# Patient Record
Sex: FEMALE | Race: BLACK OR AFRICAN AMERICAN | NOT HISPANIC OR LATINO | Employment: OTHER | URBAN - METROPOLITAN AREA
[De-identification: names, ages, dates, MRNs, and addresses within clinical notes are randomized per-mention and may not be internally consistent; named-entity substitution may affect disease eponyms.]

---

## 2024-06-10 ENCOUNTER — TELEPHONE (OUTPATIENT)
Dept: HEMATOLOGY/ONCOLOGY | Facility: HOSPITAL | Age: 79
End: 2024-06-10
Payer: MEDICARE

## 2024-06-10 NOTE — TELEPHONE ENCOUNTER
Spoke with patient, she understood that she should follow up with her PCP, PCP will refer her to medical oncology if any new symptoms come. Ms. Gutierrez verbalized understanding of the information and will see her PCP on July 11th.     ----- Message from Chely Bazan RN sent at 6/7/2024  5:24 PM EDT -----  Regarding: RE: BREAST SURVIVOR  Called patient and left the information on her voicemail. At this point unless she has new breast concerns she should just follow up with her PCP. If she has new breast concerns she can call us at [phone].  ----- Message -----  From: Kesha Chavez RN  Sent: 6/6/2024   4:42 PM EDT  To: MARCUS Ochoa; Avery Guerrero; #  Subject: RE: BREAST SURVIVOR                              Thank you Tungkashif  Breast team can someone call her tomorrow to discuss. Patients follow up with PCP at this point unless she has new concerns.  ----- Message -----  From: Avery Guerrero  Sent: 6/6/2024   1:04 PM EDT  To: Kesha Chavez RN; MARCUS Ochoa; #  Subject: RE: BREAST SURVIVOR                              Sorry I sent this to kesha earlier today I'm not sure if she saw it yet but I said     Hi, I finally got an answer from her. Her best phone number is [phone] the phone will not ring but she will answer. She stated she had a double mastectomy back in '88 and '95 at the [facility] clinic she believe it was in [place]. The surgeon name was Iva? She's not exactly sure the first name of the Dr. She states it was a male surgeon. She has been seeing a family medicine for a check up    ----- Message -----  From: MARCUS Ochoa  Sent: 6/6/2024  12:55 PM EDT  To: Kesha Chavez RN; Avery Guerrero; #  Subject: RE: BREAST SURVIVOR                              I thought I responded to this but I can't find it! She has an appointment with primary care in July. You could wait till then and see what they say. You could call her emergency  as patient maybe confused. If you  can't reach her on the phone you can send her a letter.  ----- Message -----  From: Leanna Chavez RN  Sent: 6/6/2024  11:13 AM EDT  To: Rose DE LEON MARCUS Polanco; Avery Guerrero; #  Subject: RE: BREAST SURVIVOR                              Rose,  This patient called our team for an appointment I don't see a history of breast cancer and she wanted an apt. We have not been able to get a hold of her. Should we pursue?  Thank you     ----- Message -----  From: Avery Guerrero  Sent: 6/6/2024  10:22 AM EDT  To: Leanna Chavez RN; [facility] Breast Disease Team  Subject: RE: BREAST SURVIVOR                              I tried calling pt again on the numbers listed in chart. Phone is not in service. Thank you   ----- Message -----  From: Leanna Chavez RN  Sent: 6/6/2024  10:10 AM EDT  To: Avery Guerrero; [facility] Breast Disease Team  Subject: RE: BREAST SURVIVOR                              Adding my team as I am away tomorrow.  Thank you    ----- Message -----  From: Avery Guerrero  Sent: 6/5/2024  10:34 AM EDT  To: Leanna Chavez RN  Subject: RE: BREAST SURVIVOR                              I called back no answer either and I looked on EPIC I didn't see any history as well that's why I had to message you guys. And no she did not state I will try again later to reach out to find more info. Thank you   ----- Message -----  From: Leanna Chavez RN  Sent: 6/5/2024  10:20 AM EDT  To: Avery Guerrero  Subject: RE: BREAST SURVIVOR                              Chavez Varela,  I don't see any history in Epic, do you know who she is seeing? When was her breast cancer. I tried to call her to inquire and no answer. Are you able to call her back and ask when and where she was seen?  Thank you    ----- Message -----  From: Avery Guerrero  Sent: 6/5/2024   9:41 AM EDT  To: Leanna Chavez RN  Subject: FW: BREAST SURVIVOR                              Please see message below. Thank you   ----- Message -----  From: Gela Cruz RN  Sent: 6/5/2024   9:33 AM  EDT  To: Avery Guerrero; [facility]igators Pool  Subject: RE: BREAST SURVIVOR                              Good morning,    Please reach out to Leanna Chavez as I am not sure about their survivorship process in breast.    Thanks,    Julito  ----- Message -----  From: Avery Guerrero  Sent: 6/5/2024   9:07 AM EDT  To: [facility]ators Pool  Subject: BREAST SURVIVOR                                  Good morning, pt has been seeing a family med. For a FUV for breast. Can she see one of our providers? I have looked in [place] I have not seen her scheduled with us. Please advise thank you.

## 2024-07-11 ENCOUNTER — LAB (OUTPATIENT)
Dept: LAB | Facility: LAB | Age: 79
End: 2024-07-11
Payer: MEDICARE

## 2024-07-11 ENCOUNTER — APPOINTMENT (OUTPATIENT)
Dept: PRIMARY CARE | Facility: CLINIC | Age: 79
End: 2024-07-11
Payer: MEDICARE

## 2024-07-11 VITALS
BODY MASS INDEX: 19.98 KG/M2 | HEIGHT: 65 IN | OXYGEN SATURATION: 100 % | TEMPERATURE: 97 F | HEART RATE: 69 BPM | SYSTOLIC BLOOD PRESSURE: 152 MMHG | DIASTOLIC BLOOD PRESSURE: 67 MMHG | WEIGHT: 119.9 LBS

## 2024-07-11 DIAGNOSIS — C50.912 MALIGNANT NEOPLASM OF LEFT FEMALE BREAST, UNSPECIFIED ESTROGEN RECEPTOR STATUS, UNSPECIFIED SITE OF BREAST (MULTI): ICD-10-CM

## 2024-07-11 DIAGNOSIS — Z90.13 H/O BILATERAL MASTECTOMY: Primary | ICD-10-CM

## 2024-07-11 DIAGNOSIS — I10 HYPERTENSION, ESSENTIAL, BENIGN: ICD-10-CM

## 2024-07-11 DIAGNOSIS — E11.21 CONTROLLED TYPE 2 DIABETES MELLITUS WITH DIABETIC NEPHROPATHY, UNSPECIFIED WHETHER LONG TERM INSULIN USE (MULTI): ICD-10-CM

## 2024-07-11 DIAGNOSIS — D50.8 OTHER IRON DEFICIENCY ANEMIA: ICD-10-CM

## 2024-07-11 PROBLEM — D50.9 IRON DEFICIENCY ANEMIA: Status: ACTIVE | Noted: 2024-07-11

## 2024-07-11 PROBLEM — Z90.710 H/O: HYSTERECTOMY: Status: ACTIVE | Noted: 2024-07-11

## 2024-07-11 PROBLEM — E11.9 DIABETES MELLITUS TYPE 2, CONTROLLED (MULTI): Status: ACTIVE | Noted: 2024-07-11

## 2024-07-11 LAB
ALBUMIN SERPL BCP-MCNC: 4.2 G/DL (ref 3.4–5)
ALP SERPL-CCNC: 56 U/L (ref 33–136)
ALT SERPL W P-5'-P-CCNC: 4 U/L (ref 7–45)
ANION GAP SERPL CALC-SCNC: 11 MMOL/L (ref 10–20)
AST SERPL W P-5'-P-CCNC: 16 U/L (ref 9–39)
BASOPHILS # BLD AUTO: 0.08 X10*3/UL (ref 0–0.1)
BASOPHILS NFR BLD AUTO: 1.9 %
BILIRUB SERPL-MCNC: 0.6 MG/DL (ref 0–1.2)
BUN SERPL-MCNC: 10 MG/DL (ref 6–23)
CALCIUM SERPL-MCNC: 9.1 MG/DL (ref 8.6–10.6)
CHLORIDE SERPL-SCNC: 110 MMOL/L (ref 98–107)
CHOLEST SERPL-MCNC: 69 MG/DL (ref 0–199)
CHOLESTEROL/HDL RATIO: 1.7
CO2 SERPL-SCNC: 25 MMOL/L (ref 21–32)
CREAT SERPL-MCNC: 0.62 MG/DL (ref 0.5–1.05)
DACRYOCYTES BLD QL SMEAR: NORMAL
EGFRCR SERPLBLD CKD-EPI 2021: >90 ML/MIN/1.73M*2
EOSINOPHIL # BLD AUTO: 0.15 X10*3/UL (ref 0–0.4)
EOSINOPHIL NFR BLD AUTO: 3.6 %
ERYTHROCYTE [DISTWIDTH] IN BLOOD BY AUTOMATED COUNT: 22.3 % (ref 11.5–14.5)
EST. AVERAGE GLUCOSE BLD GHB EST-MCNC: 94 MG/DL
GLUCOSE SERPL-MCNC: 103 MG/DL (ref 74–99)
HBA1C MFR BLD: 4.9 %
HCT VFR BLD AUTO: 22.2 % (ref 36–46)
HDLC SERPL-MCNC: 41.5 MG/DL
HGB BLD-MCNC: 5.1 G/DL (ref 12–16)
HYPOCHROMIA BLD QL SMEAR: NORMAL
IMM GRANULOCYTES # BLD AUTO: 0.01 X10*3/UL (ref 0–0.5)
IMM GRANULOCYTES NFR BLD AUTO: 0.2 % (ref 0–0.9)
IRON SATN MFR SERPL: ABNORMAL %
IRON SERPL-MCNC: 11 UG/DL (ref 35–150)
LDLC SERPL CALC-MCNC: 22 MG/DL
LYMPHOCYTES # BLD AUTO: 1.12 X10*3/UL (ref 0.8–3)
LYMPHOCYTES NFR BLD AUTO: 26.9 %
MCH RBC QN AUTO: 13 PG (ref 26–34)
MCHC RBC AUTO-ENTMCNC: 23 G/DL (ref 32–36)
MCV RBC AUTO: 57 FL (ref 80–100)
MONOCYTES # BLD AUTO: 0.61 X10*3/UL (ref 0.05–0.8)
MONOCYTES NFR BLD AUTO: 14.6 %
NEUTROPHILS # BLD AUTO: 2.2 X10*3/UL (ref 1.6–5.5)
NEUTROPHILS NFR BLD AUTO: 52.8 %
NON HDL CHOLESTEROL: 28 MG/DL (ref 0–149)
NRBC BLD-RTO: 0 /100 WBCS (ref 0–0)
OVALOCYTES BLD QL SMEAR: NORMAL
PLATELET # BLD AUTO: 341 X10*3/UL (ref 150–450)
POTASSIUM SERPL-SCNC: 3.8 MMOL/L (ref 3.5–5.3)
PROT SERPL-MCNC: 7.6 G/DL (ref 6.4–8.2)
RBC # BLD AUTO: 3.91 X10*6/UL (ref 4–5.2)
RBC MORPH BLD: NORMAL
SCHISTOCYTES BLD QL SMEAR: NORMAL
SODIUM SERPL-SCNC: 142 MMOL/L (ref 136–145)
TIBC SERPL-MCNC: ABNORMAL UG/DL
TRIGL SERPL-MCNC: 27 MG/DL (ref 0–149)
UIBC SERPL-MCNC: >450 UG/DL (ref 110–370)
VLDL: 5 MG/DL (ref 0–40)
WBC # BLD AUTO: 4.2 X10*3/UL (ref 4.4–11.3)

## 2024-07-11 PROCEDURE — 80053 COMPREHEN METABOLIC PANEL: CPT

## 2024-07-11 PROCEDURE — 99204 OFFICE O/P NEW MOD 45 MIN: CPT

## 2024-07-11 PROCEDURE — 3077F SYST BP >= 140 MM HG: CPT

## 2024-07-11 PROCEDURE — 3078F DIAST BP <80 MM HG: CPT

## 2024-07-11 PROCEDURE — 80061 LIPID PANEL: CPT

## 2024-07-11 PROCEDURE — 83036 HEMOGLOBIN GLYCOSYLATED A1C: CPT

## 2024-07-11 PROCEDURE — 83540 ASSAY OF IRON: CPT

## 2024-07-11 PROCEDURE — 36415 COLL VENOUS BLD VENIPUNCTURE: CPT

## 2024-07-11 PROCEDURE — 85025 COMPLETE CBC W/AUTO DIFF WBC: CPT

## 2024-07-11 PROCEDURE — 1126F AMNT PAIN NOTED NONE PRSNT: CPT

## 2024-07-11 PROCEDURE — 1159F MED LIST DOCD IN RCRD: CPT

## 2024-07-11 PROCEDURE — 83550 IRON BINDING TEST: CPT

## 2024-07-11 PROCEDURE — 1036F TOBACCO NON-USER: CPT

## 2024-07-11 ASSESSMENT — PATIENT HEALTH QUESTIONNAIRE - PHQ9
SUM OF ALL RESPONSES TO PHQ9 QUESTIONS 1 AND 2: 0
2. FEELING DOWN, DEPRESSED OR HOPELESS: NOT AT ALL
1. LITTLE INTEREST OR PLEASURE IN DOING THINGS: NOT AT ALL

## 2024-07-11 ASSESSMENT — PAIN SCALES - GENERAL: PAINLEVEL: 0-NO PAIN

## 2024-07-11 ASSESSMENT — COLUMBIA-SUICIDE SEVERITY RATING SCALE - C-SSRS
6. HAVE YOU EVER DONE ANYTHING, STARTED TO DO ANYTHING, OR PREPARED TO DO ANYTHING TO END YOUR LIFE?: NO
1. IN THE PAST MONTH, HAVE YOU WISHED YOU WERE DEAD OR WISHED YOU COULD GO TO SLEEP AND NOT WAKE UP?: NO
2. HAVE YOU ACTUALLY HAD ANY THOUGHTS OF KILLING YOURSELF?: NO

## 2024-07-11 ASSESSMENT — ENCOUNTER SYMPTOMS
LOSS OF SENSATION IN FEET: 0
OCCASIONAL FEELINGS OF UNSTEADINESS: 0
DEPRESSION: 0

## 2024-07-11 NOTE — PROGRESS NOTES
Subjective   Patient ID: Soto Gutierrez is a 78 y.o. female with PMH of Breast cancer and bilateral mastectomy, Hx Hysterectomy due to fibroid uterus, previous hx HTN, T2DM, HFpEF who presents to establish care with new PCP. Additional concern(s): Establish Care (Check up).    HPI:  - Pt is here to get some routine blood work and establish care, has no acute complaints or concerns.     - Pt has previous documented history of HTN and was on amlodipine, however, does not take any medication besides OTC ASA.  - Previously documented T2DM however, A1C 5.5 in 2018  - Pt previously was on iron supplements, had low TIBC and iron saturation  - Echo from 2019 showed  left ventricular systolic function is normal. Spectral Doppler shows an impaired relaxation pattern of left ventricular diastolic filling. The left atrium is mild to moderately dilated. Mild mitral regurgitation.    Former smoker, 0.5ppd for 20 years,has stopped for over 20 years now  Never alcohol  No other illicit drugs  Allergies - KNDA  Surgical Hx - Bilaterla mastectomy and Hysterectomy  Medications - Asa 81 mg    12 system ROS completed, negative except as noted above.    Patient care team:  No care team member to display     Patient Active Problem List   Diagnosis    H/O: hysterectomy    Malignant neoplasm of left female breast (Multi)    H/O bilateral mastectomy    Diabetes mellitus type 2, controlled (Multi)    Hypertension, essential, benign    Iron deficiency anemia     Past Medical History:   Diagnosis Date    Age-related nuclear cataract, right eye 10/02/2015    Age-related nuclear cataract of right eye    Cortical age-related cataract, right eye 10/02/2015    Cortical age-related cataract of right eye    Personal history of colonic polyps 02/07/2017    History of colon polyps    Presence of intraocular lens 10/02/2015    Pseudophakia of right eye     No past surgical history on file.  No current outpatient medications on file.     No current  "facility-administered medications for this visit.      No Known Allergies  Social History     Socioeconomic History    Marital status: Single     Spouse name: Not on file    Number of children: Not on file    Years of education: Not on file    Highest education level: Not on file   Occupational History    Not on file   Tobacco Use    Smoking status: Never    Smokeless tobacco: Never   Substance and Sexual Activity    Alcohol use: Never    Drug use: Never    Sexual activity: Not on file   Other Topics Concern    Not on file   Social History Narrative    Not on file     Social Determinants of Health     Financial Resource Strain: Not on file   Food Insecurity: Not on file   Transportation Needs: Not on file   Physical Activity: Not on file   Stress: Not on file   Social Connections: Not on file   Intimate Partner Violence: Not on file   Housing Stability: Not on file     No family history on file.     Social Determinants of Health     Tobacco Use: Low Risk  (7/11/2024)    Patient History     Smoking Tobacco Use: Never     Smokeless Tobacco Use: Never     Passive Exposure: Not on file   Alcohol Use: Not on file   Financial Resource Strain: Not on file   Food Insecurity: Not on file   Transportation Needs: Not on file   Physical Activity: Not on file   Stress: Not on file   Social Connections: Not on file   Intimate Partner Violence: Not on file   Depression: Not at risk (7/11/2024)    PHQ-2     PHQ-2 Score: 0   Housing Stability: Not on file   Utilities: Not on file   Digital Equity: Not on file   Health Literacy: Not on file        Objective   Vitals: /67 (BP Location: Right arm, Patient Position: Sitting, BP Cuff Size: Small adult)   Pulse 69   Temp 36.1 °C (97 °F) (Temporal)   Ht 1.651 m (5' 5\")   Wt 54.4 kg (119 lb 14.4 oz)   SpO2 100%   BMI 19.95 kg/m²      Physical Exam  Constitutional:       General: She is not in acute distress.     Appearance: Normal appearance. She is not ill-appearing. "   Cardiovascular:      Rate and Rhythm: Normal rate and regular rhythm.      Pulses: Normal pulses.      Heart sounds: Normal heart sounds. No murmur heard.     No gallop.   Pulmonary:      Effort: Pulmonary effort is normal.      Breath sounds: Normal breath sounds. No stridor. No wheezing.   Abdominal:      General: Abdomen is flat. Bowel sounds are normal. There is no distension.      Palpations: Abdomen is soft. There is no mass.      Tenderness: There is no abdominal tenderness. There is no guarding.   Skin:     General: Skin is warm and dry.      Coloration: Skin is not jaundiced or pale.      Findings: No erythema or rash.   Neurological:      General: No focal deficit present.      Mental Status: She is alert and oriented to person, place, and time.   Psychiatric:         Mood and Affect: Mood normal.         Behavior: Behavior normal.         Assessment/Plan   Problem List Items Addressed This Visit       Malignant neoplasm of left female breast (Multi)    Relevant Orders    BI US breast complete bilateral    H/O bilateral mastectomy - Primary    Relevant Orders    BI US breast complete bilateral    Diabetes mellitus type 2, controlled (Multi)    Relevant Orders    CBC and Auto Differential    Lipid panel    Hemoglobin A1c    Comprehensive metabolic panel    Hypertension, essential, benign    Relevant Orders    CBC and Auto Differential    Lipid panel    Hemoglobin A1c    Comprehensive metabolic panel    Iron deficiency anemia    Relevant Orders    CBC and Auto Differential    Iron and TIBC     Soto Gutierrez is a 79 yo F w/ PMH of Breast cancer and bilateral mastectomy, Hx Hysterectomy due to fibroid uterus, previous hx HTN, T2DM, HFpEF who presents to establish care with new PCP.    #HX breast Cancer s/p bilateral mastectomy  - Patient diagnosed with cancer of the left breast and underwent bilateral mastectomy  - will order bilateral complete breast ultrasound for evaluation at this time    #Previous  Diagnoses  #HTN  #T2DM  #STEVE  - Patient has been lost to follow up but had previously been diagnosed with hypertension, T2DM, and iron deficiency. All of which she was previously on medication for, however, has not taken any medication besides her OTC asa 81 mg.  - Will place orders for CBC, CMP, Iron + TIBC, A1C, Lipid Panel        RTC in 3, or earlier as needed.    Patient seen and discussed with attending physician Dr. Kelley  Plan preliminary until cosigned by attending physician.    Bob Lennon MD  Family Medicine - PGY 2

## 2024-07-12 ENCOUNTER — DOCUMENTATION (OUTPATIENT)
Dept: PRIMARY CARE | Facility: CLINIC | Age: 79
End: 2024-07-12
Payer: MEDICARE

## 2024-07-12 NOTE — PROGRESS NOTES
I saw and evaluated the patient. I personally obtained the key and critical portions of the history and physical exam or was physically present for key and critical portions performed by the resident/fellow. I reviewed the resident/fellow's documentation and discussed the patient with the resident/fellow. I agree with the resident/fellow's medical decision making as documented in the note.    Marked anemia (Hgb >6) noted, with marked microcytosis, ferritin 33, iron.  Similar picture noted in past. We'll prescribe an iron supplement, and continue to try to contact patient re: diagnostoc colonoscopy. She is asymptomatic, and might have adapted to this low hemoglobin. Hemoglobinopathy less likely since she has responeded to iron and had normal hemoglobin and hematocrit with normal MCV in past.     Ruthy Kelley MD

## 2024-07-12 NOTE — PROGRESS NOTES
Patient with critical lab result for Hemoglobin of 5.1, otherwise asymptomatic during visit on 7/11/24, with no signs of acute blood loss and vitals stable with HR 69 and /67. Pt with history notable for iron deficiency anemia and Hx of Breast Cancer. Attempted to contact patient on 7/11/24 at 6:30 PM at numbers listed on file ([phone] Mobile, and [phone] Home) and tried contacting sibling Mauricio Gutierrez at number on file without success. Attempted again at 7:30 PM, again at all numbers listed above, again without success. Resident covering inpatient service overnight attempted to contact patient at approximately 9:30 PM, again without success.     On 7/12/24, contacted patient at 10 AM and again at 12:30 PM, Mobile phone ' unable to accept calls' and secondary phone with busy dial tone. Attending physician, Dr. Kelley, also attempted to contact patient without success.     Discussed patient with attending physician, Dr. Kelley, will attempt to contact patient again 7/13/24. Plan to contact patient and urge her to go to emergency department for repeat lab testing and likely blood transfusion. Will consider iron supplementation for patient given history and low iron levels on lab testing.     Patient seen and discussed with attending physician Dr. Kelley  Plan preliminary until cosigned by attending physician.    Bob Lennon MD  Family Medicine - PGY 2

## 2024-07-12 NOTE — PROGRESS NOTES
"Received a page from [facility] for patient Soto Gutierrez critical lab result with Hgb 5.1g/dL. Patient was seen at [facility] today by Dr. Lennon. Attempted to contact patient to make aware of lab results, inquire if patient is symptomatic, and likely send to emergency department for transfusion. Called patient's mobile phone and home phone that is listed in EMR. His mobile phone states \"patient cannot receive messages at this time\" and called home phone listed which is busy tone. Wanted to contact Emergency contact listed but number is same as home number listed which is a busy tone. Will send message to provider to attempt to contact patient as well.   "

## 2024-07-13 ENCOUNTER — DOCUMENTATION (OUTPATIENT)
Dept: PRIMARY CARE | Facility: CLINIC | Age: 79
End: 2024-07-13
Payer: MEDICARE

## 2024-07-13 ENCOUNTER — HOSPITAL ENCOUNTER (EMERGENCY)
Facility: HOSPITAL | Age: 79
Discharge: HOME | End: 2024-07-14
Attending: STUDENT IN AN ORGANIZED HEALTH CARE EDUCATION/TRAINING PROGRAM

## 2024-07-13 DIAGNOSIS — D64.9 ANEMIA, UNSPECIFIED TYPE: Primary | ICD-10-CM

## 2024-07-13 LAB
ALBUMIN SERPL BCP-MCNC: 4 G/DL (ref 3.4–5)
ALP SERPL-CCNC: 59 U/L (ref 33–136)
ALT SERPL W P-5'-P-CCNC: 5 U/L (ref 7–45)
ANION GAP SERPL CALC-SCNC: 13 MMOL/L (ref 10–20)
AST SERPL W P-5'-P-CCNC: 18 U/L (ref 9–39)
BASOPHILS # BLD MANUAL: 0.12 X10*3/UL (ref 0–0.1)
BASOPHILS NFR BLD MANUAL: 2.5 %
BILIRUB SERPL-MCNC: 0.5 MG/DL (ref 0–1.2)
BUN SERPL-MCNC: 10 MG/DL (ref 6–23)
CALCIUM SERPL-MCNC: 8.9 MG/DL (ref 8.6–10.6)
CHLORIDE SERPL-SCNC: 106 MMOL/L (ref 98–107)
CO2 SERPL-SCNC: 24 MMOL/L (ref 21–32)
CREAT SERPL-MCNC: 0.6 MG/DL (ref 0.5–1.05)
EGFRCR SERPLBLD CKD-EPI 2021: >90 ML/MIN/1.73M*2
EOSINOPHIL # BLD MANUAL: 0.15 X10*3/UL (ref 0–0.4)
EOSINOPHIL NFR BLD MANUAL: 3.3 %
ERYTHROCYTE [DISTWIDTH] IN BLOOD BY AUTOMATED COUNT: 21.6 % (ref 11.5–14.5)
GLUCOSE SERPL-MCNC: 60 MG/DL (ref 74–99)
HCT VFR BLD AUTO: 20.1 % (ref 36–46)
HGB BLD-MCNC: 5 G/DL (ref 12–16)
HYPOCHROMIA BLD QL SMEAR: ABNORMAL
IMM GRANULOCYTES # BLD AUTO: 0.01 X10*3/UL (ref 0–0.5)
IMM GRANULOCYTES NFR BLD AUTO: 0.2 % (ref 0–0.9)
IRON SATN MFR SERPL: 3 % (ref 25–45)
IRON SERPL-MCNC: 13 UG/DL (ref 35–150)
LYMPHOCYTES # BLD MANUAL: 2.19 X10*3/UL (ref 0.8–3)
LYMPHOCYTES NFR BLD MANUAL: 47.5 %
MCH RBC QN AUTO: 13.2 PG (ref 26–34)
MCHC RBC AUTO-ENTMCNC: 24.9 G/DL (ref 32–36)
MCV RBC AUTO: 53 FL (ref 80–100)
MONOCYTES # BLD MANUAL: 0.42 X10*3/UL (ref 0.05–0.8)
MONOCYTES NFR BLD MANUAL: 9.2 %
NEUTS SEG # BLD MANUAL: 1.73 X10*3/UL (ref 1.6–5)
NEUTS SEG NFR BLD MANUAL: 37.5 %
NRBC BLD-RTO: 0 /100 WBCS (ref 0–0)
OVALOCYTES BLD QL SMEAR: ABNORMAL
PLATELET # BLD AUTO: 289 X10*3/UL (ref 150–450)
POTASSIUM SERPL-SCNC: 3.2 MMOL/L (ref 3.5–5.3)
PROT SERPL-MCNC: 7.7 G/DL (ref 6.4–8.2)
RBC # BLD AUTO: 3.79 X10*6/UL (ref 4–5.2)
RBC MORPH BLD: ABNORMAL
SODIUM SERPL-SCNC: 140 MMOL/L (ref 136–145)
TARGETS BLD QL SMEAR: ABNORMAL
TIBC SERPL-MCNC: 436 UG/DL (ref 240–445)
TOTAL CELLS COUNTED BLD: 120
UIBC SERPL-MCNC: 423 UG/DL (ref 110–370)
WBC # BLD AUTO: 4.6 X10*3/UL (ref 4.4–11.3)

## 2024-07-13 PROCEDURE — 84075 ASSAY ALKALINE PHOSPHATASE: CPT

## 2024-07-13 PROCEDURE — 80053 COMPREHEN METABOLIC PANEL: CPT

## 2024-07-13 PROCEDURE — 99285 EMERGENCY DEPT VISIT HI MDM: CPT

## 2024-07-13 PROCEDURE — 2500000002 HC RX 250 W HCPCS SELF ADMINISTERED DRUGS (ALT 637 FOR MEDICARE OP, ALT 636 FOR OP/ED)

## 2024-07-13 PROCEDURE — 86923 COMPATIBILITY TEST ELECTRIC: CPT

## 2024-07-13 PROCEDURE — 36415 COLL VENOUS BLD VENIPUNCTURE: CPT

## 2024-07-13 PROCEDURE — 83540 ASSAY OF IRON: CPT

## 2024-07-13 PROCEDURE — 86901 BLOOD TYPING SEROLOGIC RH(D): CPT

## 2024-07-13 PROCEDURE — 85027 COMPLETE CBC AUTOMATED: CPT

## 2024-07-13 PROCEDURE — 85007 BL SMEAR W/DIFF WBC COUNT: CPT

## 2024-07-13 RX ORDER — POTASSIUM CHLORIDE 20 MEQ/1
40 TABLET, EXTENDED RELEASE ORAL ONCE
Status: COMPLETED | OUTPATIENT
Start: 2024-07-13 | End: 2024-07-13

## 2024-07-13 ASSESSMENT — LIFESTYLE VARIABLES
TOTAL SCORE: 0
EVER FELT BAD OR GUILTY ABOUT YOUR DRINKING: NO
EVER HAD A DRINK FIRST THING IN THE MORNING TO STEADY YOUR NERVES TO GET RID OF A HANGOVER: NO
HAVE PEOPLE ANNOYED YOU BY CRITICIZING YOUR DRINKING: NO
HAVE YOU EVER FELT YOU SHOULD CUT DOWN ON YOUR DRINKING: NO

## 2024-07-13 ASSESSMENT — PAIN SCALES - GENERAL
PAINLEVEL_OUTOF10: 0 - NO PAIN

## 2024-07-13 ASSESSMENT — PAIN - FUNCTIONAL ASSESSMENT: PAIN_FUNCTIONAL_ASSESSMENT: 0-10

## 2024-07-13 NOTE — PROGRESS NOTES
Contacted patient at 3 PM, on 7/13/24, regarding Critical CBC for Hgb of 5.1 and MCV of 57. Patient successfully reached at mobile phone number. Discussed lab result, health implications and reasoning for urgent evaluation. Pt was able to display understanding and verbalize risks of delayed care. Pt agreed to visit [facility] emergency department and understood she would likely get repeat blood testing and possibly blood transfusion if indicated, to which she agreed to.     Pt states she plans to get dressed and will head to the emergency department within the hour.     Patient discussed with attending physician Dr. Warren Lennon MD  Family Medicine  PGY-2

## 2024-07-13 NOTE — ED PROVIDER NOTES
HPI   Chief Complaint   Patient presents with    abnormal labs       HPI                    Rosalee Coma Scale Score: 15                     Patient History   Past Medical History:   Diagnosis Date    Age-related nuclear cataract, right eye 10/02/2015    Age-related nuclear cataract of right eye    Cortical age-related cataract, right eye 10/02/2015    Cortical age-related cataract of right eye    Personal history of colonic polyps 02/07/2017    History of colon polyps    Presence of intraocular lens 10/02/2015    Pseudophakia of right eye     No past surgical history on file.  No family history on file.  Social History     Tobacco Use    Smoking status: Never    Smokeless tobacco: Never   Substance Use Topics    Alcohol use: Never    Drug use: Never       Physical Exam   ED Triage Vitals [07/13/24 1933]   Temperature Heart Rate Respirations BP   36.7 °C (98 °F) 72 17 134/50      Pulse Ox Temp Source Heart Rate Source Patient Position   100 % Temporal Monitor Sitting      BP Location FiO2 (%)     Left arm --       Physical Exam    ED Course & MDM        Medical Decision Making      Procedure  Procedures

## 2024-07-14 VITALS
HEART RATE: 65 BPM | OXYGEN SATURATION: 100 % | SYSTOLIC BLOOD PRESSURE: 158 MMHG | WEIGHT: 119 LBS | TEMPERATURE: 98.5 F | HEIGHT: 65 IN | RESPIRATION RATE: 16 BRPM | DIASTOLIC BLOOD PRESSURE: 82 MMHG | BODY MASS INDEX: 19.83 KG/M2

## 2024-07-14 LAB
ABO GROUP (TYPE) IN BLOOD: NORMAL
ABO GROUP (TYPE) IN BLOOD: NORMAL
ANTIBODY SCREEN: NORMAL
BLOOD EXPIRATION DATE: NORMAL
BLOOD EXPIRATION DATE: NORMAL
DISPENSE STATUS: NORMAL
DISPENSE STATUS: NORMAL
ERYTHROCYTE [DISTWIDTH] IN BLOOD BY AUTOMATED COUNT: 30.9 % (ref 11.5–14.5)
HCT VFR BLD AUTO: 25.9 % (ref 36–46)
HGB BLD-MCNC: 7.2 G/DL (ref 12–16)
MCH RBC QN AUTO: 16.7 PG (ref 26–34)
MCHC RBC AUTO-ENTMCNC: 27.8 G/DL (ref 32–36)
MCV RBC AUTO: 60 FL (ref 80–100)
NRBC BLD-RTO: 0 /100 WBCS (ref 0–0)
PLATELET # BLD AUTO: 223 X10*3/UL (ref 150–450)
PRODUCT BLOOD TYPE: 6200
PRODUCT BLOOD TYPE: 6200
PRODUCT CODE: NORMAL
PRODUCT CODE: NORMAL
RBC # BLD AUTO: 4.3 X10*6/UL (ref 4–5.2)
RH FACTOR (ANTIGEN D): NORMAL
RH FACTOR (ANTIGEN D): NORMAL
UNIT ABO: NORMAL
UNIT ABO: NORMAL
UNIT NUMBER: NORMAL
UNIT NUMBER: NORMAL
UNIT RH: NORMAL
UNIT RH: NORMAL
UNIT VOLUME: 350
UNIT VOLUME: 350
WBC # BLD AUTO: 3.9 X10*3/UL (ref 4.4–11.3)
XM INTEP: NORMAL
XM INTEP: NORMAL

## 2024-07-14 PROCEDURE — P9016 RBC LEUKOCYTES REDUCED: HCPCS

## 2024-07-14 PROCEDURE — 85027 COMPLETE CBC AUTOMATED: CPT | Performed by: PHYSICIAN ASSISTANT

## 2024-07-14 PROCEDURE — 36415 COLL VENOUS BLD VENIPUNCTURE: CPT | Performed by: PHYSICIAN ASSISTANT

## 2024-07-14 PROCEDURE — 36430 TRANSFUSION BLD/BLD COMPNT: CPT

## 2024-07-14 RX ORDER — FERROUS SULFATE 325(65) MG
325 TABLET ORAL DAILY
Qty: 30 TABLET | Refills: 0 | Status: SHIPPED | OUTPATIENT
Start: 2024-07-14 | End: 2024-08-13

## 2024-07-14 ASSESSMENT — PAIN SCALES - GENERAL
PAINLEVEL_OUTOF10: 0 - NO PAIN

## 2024-07-14 NOTE — ED PROVIDER NOTES
Emergency Department Provider Note        History of Present Illness     History provided by: Patient  Limitations to History: None    HPI:  Soto Gutierrez is a 78 y.o. female Patient is a 78-year-old female with a past medical history of breast cancer status postmastectomy, no chemotherapy or radiation presented to the ED for anemia.  Patient states she was sent here by her primary care physician who was called her stating that she had a low hemoglobin.  Patient states that she is asymptomatic and denies any blood in the stool, blood in the vomit, vaginal bleeding.  Patient states that she supposed take iron pills however has not taken them for years.  Patient denying any chest pain, shortness of breath, abdominal pain, dizziness or lightheadedness.  Patient states that she had a mastectomy and is not undergoing any chemotherapy or radiation.    Physical Exam   Triage vitals:  T 36.7 °C (98 °F)  HR 72  /50  RR 17  O2 100 % None (Room air)    General: Awake, alert, in no acute distress  Eyes: Gaze conjugate.  No scleral icterus or injection  HENT: Normo-cephalic, atraumatic. No stridor  CV: Regular rate, regular rhythm. Radial pulses 2+ bilaterally  Resp: Breathing non-labored, speaking in full sentences.  Clear to auscultation bilaterally  GI: Soft, non-distended, non-tender. No rebound or guarding.  MSK/Extremities: No gross bony deformities. Moving all extremities  Skin: Warm. Appropriate color  Neuro: Alert. Oriented. Face symmetric. Speech is fluent.  Gross strength and sensation intact in b/l UE and LEs  Psych: Appropriate mood and affect    Medical Decision Making & ED Course   Medical Decision Makin y.o. female presented to the ED for blood transfusion.  On arrival patient was hemodynamically stable, heart rate 79, normotensive.  Patient denies any blood in stool, blood in the urine, vaginal bleeding, hematemesis, hemoptysis.  Patient denies any chest pain, abdominal pain, dizziness or  lightheadedness.  Patient was sent here by her PCP for hemoglobin of 5.  Repeat hemoglobin in the ED with a hemoglobin of 5.  Patient was consented and 2 units of packed red blood cells was ordered.  Patient was seen to have a microcytic anemia and could likely benefit from an iron transfusion outpatient.  Patient was signed out pending her 2 units packed red blood cells.     Disposition   Patient was signed out to Dr. Rene Mathias pending completion of their work-up.  Please see the next provider's transition of care note for the remainder of the patient's care.     Procedures   Procedures    Patient seen and discussed with ED attending physician.    Marlen Johnson MD  Emergency Medicine      Marlen Johnson MD  Resident  07/13/24 1341

## 2024-07-14 NOTE — PROGRESS NOTES
I received Soto Gutierrez in signout from Eileen Miranda.  Please see the previous note for all HPI, PE and MDM up to the time of signout at 7am.    In brief Soto Gutierrez is an 78 y.o. female presenting for   Patient is a 78-year-old female with history of breast cancer postmastectomy who is not on chemotherapy or radiation who presented to the ED for anemia, patient had not seen a doctor for several years and had routine blood work done when she finally decided to see a family doctor, she was incidentally found to have a hemoglobin of 5.  She has absolutely no symptoms, no shortness of breath chest pain dizziness fatigue or weakness, denies any blood in her stools or any bleeding.  She was previously on iron supplementation for iron deficiency anemia but has not taken it in years.  She is not here for a blood transfusion, 2 units of blood were transfused, hemoglobin improved to 7.2, she was also mildly hypokalemic with a potassium of 3.2 which was replenished, offered admission however patient is asymptomatic, she does not want stay in the hospital, has close follow-up, we did discuss case with her primary care doctor, Dr Bob Lennon, they see no reason for admission, feel that patient can safely be discharged.  Patient be discharged with hematology follow-up and iron supplementation.  She is encouraged to return with any new or worsening symptoms including dizziness fatigue shortness of breath or chest pain, encouraged follow-up with PCP as well as outpatient hematology.  Patient can safely be discharged at this time.  No orders to display     Labs Reviewed   CBC WITH AUTO DIFFERENTIAL - Abnormal       Result Value    WBC 4.6      nRBC 0.0      RBC 3.79 (*)     Hemoglobin 5.0 (*)     Hematocrit 20.1 (*)     MCV 53 (*)     MCH 13.2 (*)     MCHC 24.9 (*)     RDW 21.6 (*)     Platelets 289      Immature Granulocytes %, Automated 0.2      Immature Granulocytes Absolute, Automated 0.01      Narrative:      The previously reported component Neutrophils % is no longer being reported.  The previously reported component Lymphocytes % is no longer being reported.  The previously reported component Monocytes % is no longer being reported.  The previously   reported component Eosinophils % is no longer being reported.  The previously reported component Basophils % is no longer being reported.  The previously reported component Absolute Neutrophils is no longer being reported.  The previously reported   component Absolute Lymphocytes is no longer being reported.  The previously reported component Absolute Monocytes is no longer being reported.  The previously reported component Absolute Eosinophils is no longer being reported.  The previously reported   component Absolute Basophils is no longer being reported.   COMPREHENSIVE METABOLIC PANEL - Abnormal    Glucose 60 (*)     Sodium 140      Potassium 3.2 (*)     Chloride 106      Bicarbonate 24      Anion Gap 13      Urea Nitrogen 10      Creatinine 0.60      eGFR >90      Calcium 8.9      Albumin 4.0      Alkaline Phosphatase 59      Total Protein 7.7      AST 18      Bilirubin, Total 0.5      ALT 5 (*)    IRON AND TIBC - Abnormal    Iron 13 (*)     UIBC 423 (*)     TIBC 436      % Saturation 3 (*)    CBC - Abnormal    WBC 3.9 (*)     nRBC 0.0      RBC 4.30      Hemoglobin 7.2 (*)     Hematocrit 25.9 (*)     MCV 60 (*)     MCH 16.7 (*)     MCHC 27.8 (*)     RDW 30.9 (*)     Platelets 223     MANUAL DIFFERENTIAL - Abnormal    Neutrophils %, Manual 37.5      Lymphocytes %, Manual 47.5      Monocytes %, Manual 9.2      Eosinophils %, Manual 3.3      Basophils %, Manual 2.5      Seg Neutrophils Absolute, Manual 1.73      Lymphocytes Absolute, Manual 2.19      Monocytes Absolute, Manual 0.42      Eosinophils Absolute, Manual 0.15      Basophils Absolute, Manual 0.12 (*)     Total Cells Counted 120      RBC Morphology See Below      Hypochromia Marked      Target Cells Few       Ovalocytes Few     TYPE AND SCREEN    ABO TYPE A      Rh TYPE POS      ANTIBODY SCREEN NEG     VERAB/VERIFY ABORH    ABO TYPE A      Rh TYPE POS     PREPARE RBC    PRODUCT CODE B5984J25      Unit Number [id number]      Unit ABO A      Unit RH POS      XM INTEP COMP      Dispense Status TR      Blood Expiration Date 7/20/2024 11:59:00 PM EDT      PRODUCT BLOOD TYPE 6200      UNIT VOLUME 350      PRODUCT CODE X7632R58      Unit Number [id number]      Unit ABO A      Unit RH POS      XM INTEP COMP      Dispense Status TR      Blood Expiration Date 7/25/2024 11:59:00 PM EDT      PRODUCT BLOOD TYPE 6200      UNIT VOLUME 350       Diagnoses as of 07/14/24 1706   Anemia, unspecified type         .e  Chief Complaint   Patient presents with    abnormal labs   .  At the time of signout we were awaiting: transfusion and repeat HGB        Pt Disposition: Discharge

## 2024-09-05 ENCOUNTER — APPOINTMENT (OUTPATIENT)
Dept: PRIMARY CARE | Facility: CLINIC | Age: 79
End: 2024-09-05
Payer: MEDICARE

## 2024-09-05 VITALS
HEART RATE: 66 BPM | SYSTOLIC BLOOD PRESSURE: 153 MMHG | TEMPERATURE: 98.8 F | BODY MASS INDEX: 19.76 KG/M2 | OXYGEN SATURATION: 100 % | WEIGHT: 118.6 LBS | HEIGHT: 65 IN | DIASTOLIC BLOOD PRESSURE: 74 MMHG

## 2024-09-05 DIAGNOSIS — D50.8 OTHER IRON DEFICIENCY ANEMIA: Primary | ICD-10-CM

## 2024-09-05 PROCEDURE — 99213 OFFICE O/P EST LOW 20 MIN: CPT

## 2024-09-05 PROCEDURE — 3078F DIAST BP <80 MM HG: CPT

## 2024-09-05 PROCEDURE — 1126F AMNT PAIN NOTED NONE PRSNT: CPT

## 2024-09-05 PROCEDURE — 1036F TOBACCO NON-USER: CPT

## 2024-09-05 PROCEDURE — 3077F SYST BP >= 140 MM HG: CPT

## 2024-09-05 PROCEDURE — 1159F MED LIST DOCD IN RCRD: CPT

## 2024-09-05 RX ORDER — CHLORTHALIDONE 25 MG/1
1 TABLET ORAL DAILY
COMMUNITY
Start: 2018-10-09

## 2024-09-05 RX ORDER — AMLODIPINE BESYLATE 10 MG/1
1 TABLET ORAL DAILY
COMMUNITY
Start: 2018-10-09

## 2024-09-05 ASSESSMENT — ENCOUNTER SYMPTOMS: DEPRESSION: 1

## 2024-09-05 ASSESSMENT — PATIENT HEALTH QUESTIONNAIRE - PHQ9
2. FEELING DOWN, DEPRESSED OR HOPELESS: NOT AT ALL
1. LITTLE INTEREST OR PLEASURE IN DOING THINGS: NOT AT ALL
SUM OF ALL RESPONSES TO PHQ9 QUESTIONS 1 AND 2: 0

## 2024-09-05 ASSESSMENT — PAIN SCALES - GENERAL: PAINLEVEL: 0-NO PAIN

## 2024-09-05 NOTE — PROGRESS NOTES
"Subjective   Patient ID: Soto Gutierrez is a 78 y.o. female who presents for Follow-up.    Soto Gutierrez is a 78 y.o. female with PMH of Breast cancer and bilateral mastectomy, Hx Hysterectomy due to fibroid uterus, previous hx HTN, T2DM, HFpEF who presents for follow-up regarding low hemoglobin.    Patient was recently found to have hemoglobin of 5.1 on lab testing. Was asymptomatic at that time and had no concerns or evidence of bleeding or blood loss. Patient was sent to the ED and received 2 units of blood and incremented appropriately above 7 before being discharged with iron supplements.     Today patient states she feels at her usually baseline and has no acute complaints., no bleeding, no fatigue, dizziness, lightheadedness.           Review of Systems    Objective   /74 (BP Location: Right arm, Patient Position: Sitting)   Pulse 66   Temp 37.1 °C (98.8 °F) (Temporal)   Ht 1.651 m (5' 5\")   Wt 53.8 kg (118 lb 9.6 oz)   SpO2 100%   BMI 19.74 kg/m²     Physical Exam  Constitutional:       General: She is not in acute distress.     Appearance: Normal appearance. She is not ill-appearing.   Cardiovascular:      Rate and Rhythm: Normal rate and regular rhythm.      Pulses: Normal pulses.      Heart sounds: Normal heart sounds. No murmur heard.     No gallop.   Pulmonary:      Effort: Pulmonary effort is normal.      Breath sounds: Normal breath sounds. No stridor. No wheezing.   Abdominal:      General: Abdomen is flat. Bowel sounds are normal. There is no distension.      Palpations: Abdomen is soft. There is no mass.      Tenderness: There is no abdominal tenderness. There is no guarding.   Skin:     General: Skin is warm and dry.      Coloration: Skin is not jaundiced or pale.      Findings: No erythema or rash.   Neurological:      General: No focal deficit present.      Mental Status: She is alert and oriented to person, place, and time.   Psychiatric:         Mood and Affect: Mood " normal.         Behavior: Behavior normal.         Assessment/Plan   Problem List Items Addressed This Visit       Iron deficiency anemia - Primary    Relevant Orders    CBC and Auto Differential    Iron and TIBC     Soto Gutierrez is a 78 y.o. female with PMH of Breast cancer and bilateral mastectomy, Hx Hysterectomy due to fibroid uterus, previous hx HTN, T2DM, HFpEF who presents for follow-up regarding low hemoglobin. Patient incremented appropriately and has been asymptomatic and with no evidence of bleeding.      #Iron Deficiency Anemia  #s/p Transfusion  - Patient previously noted to have Hgb of 5.1  - was seen in the ED and received 2 units and incremented appropriately, and was started on Iron supplements  - Pt has been asymptomatic and no concerns for bleeding  - will recheck CBC and iron studies at this time       RTC in 2-3 months for Medicare Annual Visit, or earlier as needed.    Patient seen and discussed with attending physician Dr. Kelley  Plan preliminary until cosigned by attending physician.    Bob Lennon MD  Family Medicine  PGY-2

## 2024-09-06 NOTE — PROGRESS NOTES
I reviewed the resident/fellow's documentation and discussed the patient with the resident/fellow. I agree with the resident/fellow's medical decision making as documented in the note. I discussed but did not see the patient consistent with the Primary Care Exception.      Ruthy Kelley MD

## 2024-10-31 ENCOUNTER — APPOINTMENT (OUTPATIENT)
Dept: PRIMARY CARE | Facility: CLINIC | Age: 79
End: 2024-10-31
Payer: MEDICARE

## 2024-11-25 ENCOUNTER — APPOINTMENT (OUTPATIENT)
Dept: HEMATOLOGY/ONCOLOGY | Facility: HOSPITAL | Age: 79
End: 2024-11-25
Payer: MEDICARE